# Patient Record
(demographics unavailable — no encounter records)

---

## 2025-05-19 NOTE — HISTORY OF PRESENT ILLNESS
[de-identified] : cough runny nose sneezing x 2-3 weeks, per dad taking xyzal daily x 3 weeks  no fever eating and drinking well, per pt states 1 day last week while at recess felt like it was hard to catch her breath no hx of asthma or wheeze [FreeTextEntry6] : c/o shortness of breath with activity occurs during recess, dance, and after swimming coughs a lot and feels like she is wheezing